# Patient Record
Sex: MALE
[De-identification: names, ages, dates, MRNs, and addresses within clinical notes are randomized per-mention and may not be internally consistent; named-entity substitution may affect disease eponyms.]

---

## 2019-06-18 ENCOUNTER — HOSPITAL ENCOUNTER (OUTPATIENT)
Dept: HOSPITAL 90 - RAH | Age: 84
Discharge: HOME | End: 2019-06-18
Attending: INTERNAL MEDICINE
Payer: MEDICARE

## 2019-06-18 DIAGNOSIS — K21.9: ICD-10-CM

## 2019-06-18 DIAGNOSIS — R13.12: Primary | ICD-10-CM

## 2019-06-18 PROCEDURE — 74230 X-RAY XM SWLNG FUNCJ C+: CPT

## 2019-06-18 PROCEDURE — 92611 MOTION FLUOROSCOPY/SWALLOW: CPT
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
